# Patient Record
Sex: FEMALE | Race: BLACK OR AFRICAN AMERICAN | Employment: FULL TIME | URBAN - METROPOLITAN AREA
[De-identification: names, ages, dates, MRNs, and addresses within clinical notes are randomized per-mention and may not be internally consistent; named-entity substitution may affect disease eponyms.]

---

## 2018-05-04 PROBLEM — D72.819 CHRONIC LEUKOPENIA: Status: ACTIVE | Noted: 2018-05-04

## 2018-05-04 PROBLEM — D64.9 CHRONIC ANEMIA: Status: ACTIVE | Noted: 2018-05-04

## 2022-03-18 PROBLEM — D64.9 CHRONIC ANEMIA: Status: ACTIVE | Noted: 2018-05-04

## 2022-03-18 PROBLEM — D72.819 CHRONIC LEUKOPENIA: Status: ACTIVE | Noted: 2018-05-04

## 2023-01-10 DIAGNOSIS — D72.819 LEUKOPENIA, UNSPECIFIED TYPE: ICD-10-CM

## 2023-01-10 DIAGNOSIS — D47.2 MONOCLONAL GAMMOPATHY: ICD-10-CM

## 2023-01-10 DIAGNOSIS — D72.819 CHRONIC LEUKOPENIA: ICD-10-CM

## 2023-01-10 DIAGNOSIS — D64.9 ANEMIA, UNSPECIFIED TYPE: ICD-10-CM

## 2023-01-11 ENCOUNTER — LAB ONLY (OUTPATIENT)
Dept: ONCOLOGY | Age: 53
End: 2023-01-11

## 2023-01-11 DIAGNOSIS — D72.819 LEUKOPENIA, UNSPECIFIED TYPE: ICD-10-CM

## 2023-01-11 DIAGNOSIS — D72.819 CHRONIC LEUKOPENIA: ICD-10-CM

## 2023-01-11 DIAGNOSIS — D64.9 ANEMIA, UNSPECIFIED TYPE: ICD-10-CM

## 2023-01-11 DIAGNOSIS — D47.2 MONOCLONAL GAMMOPATHY: Primary | ICD-10-CM

## 2023-01-12 LAB
ABSOLUTE LYMPHOCYTE COUNT, 10803: 2.4 K/UL (ref 1–4.8)
BASOPHILS # BLD: 0 K/UL (ref 0–0.2)
BASOPHILS NFR BLD: 0 % (ref 0–2)
EOSINOPHIL # BLD: 0.1 K/UL (ref 0–0.5)
EOSINOPHIL NFR BLD: 1 % (ref 0–6)
ERYTHROCYTE [DISTWIDTH] IN BLOOD BY AUTOMATED COUNT: 14.5 % (ref 10–15.5)
GRANULOCYTES,GRANS: 49 % (ref 40–75)
HCT VFR BLD AUTO: 42.1 % (ref 35.1–48)
HGB BLD-MCNC: 12 G/DL (ref 11.7–16)
LYMPHOCYTES, LYMLT: 43 % (ref 20–45)
MCH RBC QN AUTO: 23 PG (ref 26–34)
MCHC RBC AUTO-ENTMCNC: 29 G/DL (ref 31–36)
MCV RBC AUTO: 81 FL (ref 80–99)
MONOCYTES # BLD: 0.4 K/UL (ref 0.1–1)
MONOCYTES NFR BLD: 7 % (ref 3–12)
NEUTROPHILS # BLD AUTO: 2.7 K/UL (ref 1.8–7.7)
PLATELET # BLD AUTO: 430 K/UL (ref 140–440)
PMV BLD AUTO: 10.3 FL (ref 9–13)
RBC # BLD AUTO: 5.23 M/UL (ref 3.8–5.2)
WBC # BLD AUTO: 5.5 K/UL (ref 4–11)

## 2023-01-16 DIAGNOSIS — D47.2 MONOCLONAL GAMMOPATHY: Primary | ICD-10-CM

## 2023-01-24 ENCOUNTER — OFFICE VISIT (OUTPATIENT)
Dept: ONCOLOGY | Age: 53
End: 2023-01-24
Payer: COMMERCIAL

## 2023-01-24 VITALS
HEIGHT: 68 IN | SYSTOLIC BLOOD PRESSURE: 124 MMHG | WEIGHT: 172.6 LBS | RESPIRATION RATE: 16 BRPM | DIASTOLIC BLOOD PRESSURE: 73 MMHG | BODY MASS INDEX: 26.16 KG/M2 | HEART RATE: 46 BPM

## 2023-01-24 DIAGNOSIS — D72.819 LEUKOPENIA, UNSPECIFIED TYPE: ICD-10-CM

## 2023-01-24 DIAGNOSIS — D47.2 MGUS (MONOCLONAL GAMMOPATHY OF UNKNOWN SIGNIFICANCE): ICD-10-CM

## 2023-01-24 DIAGNOSIS — D64.9 ANEMIA, UNSPECIFIED TYPE: ICD-10-CM

## 2023-01-24 DIAGNOSIS — D47.2 MONOCLONAL GAMMOPATHY: Primary | ICD-10-CM

## 2023-01-24 DIAGNOSIS — D72.819 CHRONIC LEUKOPENIA: ICD-10-CM

## 2023-01-24 PROCEDURE — 99213 OFFICE O/P EST LOW 20 MIN: CPT | Performed by: NURSE PRACTITIONER

## 2023-01-24 NOTE — PROGRESS NOTES
Hematology/Oncology  Progress Note    Name: Geovanna Owens  Date: 2023  : 1970    Sapna Hartley MD     Ms. Charlene Anaya is a 46y.o. year old female who was seen for MGUS       Subjective:   Ms. Emilie Cohen is a 60-year-old woman who has slowly progressive anemia in the setting of monoclonal gammopathy. She was diagnosed in 2014. She reports she has some left hip pain and she is followed by an orthopedic specialist and now has gout. The patient otherwise has no other complaints. Denied fever, chills, night sweat, unintentional weight loss, skin lumps or bumps, acute bleeding or bruising issues. Denied headache, acute vision change, dizziness, chest pain, worsen shortness of breath, palpitation, productive cough, nausea, vomiting, abdominal pain, altered bowel habits, dysuria, new bone pain or back pain, focal numbness or weakness      Past medical history, family history, and social history: these were reviewed and remains unchanged.     Past Medical History:   Diagnosis Date    Abdominal pain, unspecified site     Abnormal mammogram, unspecified     Acute nasopharyngitis (common cold)     Asthma     Cervicitis and endocervicitis     Diarrhea     Genital herpes, unspecified     Hypertension     no medication     Iron deficiency anemia, unspecified     Left breast mass 2013    surgery scheduled    Leukorrhea, not specified as infective     Neurofibromatosis, unspecified(237.70)     Other forms of retinal detachment(361.89)     Other nonspecific (abnormal) findings on radiological and other examinations of body structure     Pain in limb     Unspecified constipation     Unspecified disorder of menstruation and other abnormal bleeding from female genital tract     Vaginitis and vulvovaginitis, unspecified 2012     Past Surgical History:   Procedure Laterality Date    HX BREAST BIOPSY  2013    BREAST NEEDLE LOCALIZED BIOPSY performed by Marnette Dakin, MD at [address] left    HX HYSTERECTOMY  2015    partial    HX OTHER SURGICAL      mass removed from left under arm    HX TUBAL LIGATION       Social History     Socioeconomic History    Marital status: LEGALLY      Spouse name: Not on file    Number of children: Not on file    Years of education: Not on file    Highest education level: Not on file   Occupational History    Not on file   Tobacco Use    Smoking status: Former     Types: Cigarettes     Quit date: 1/3/2003     Years since quittin.0    Smokeless tobacco: Never   Substance and Sexual Activity    Alcohol use: Yes     Alcohol/week: 1.7 standard drinks     Types: 2 Cans of beer per week     Comment: socially    Drug use: No    Sexual activity: Not on file   Other Topics Concern    Not on file   Social History Narrative    Not on file     Social Determinants of Health     Financial Resource Strain: Not on file   Food Insecurity: Not on file   Transportation Needs: Not on file   Physical Activity: Not on file   Stress: Not on file   Social Connections: Not on file   Intimate Partner Violence: Not on file   Housing Stability: Not on file     Family History   Problem Relation Age of Onset    Breast Cancer Sister 43    Diabetes Mother     Stroke Mother     Cancer Father         prostate cancer    Cancer Sister         lung cancer    Heart Disease Neg Hx          Review of Systems   Constitutional: Negative. HENT: Negative. Eyes: Negative. Respiratory: Negative. Cardiovascular: Negative. Gastrointestinal: Negative. Genitourinary: Negative. Musculoskeletal: Negative. Skin: Negative. Neurological: Negative. Endo/Heme/Allergies: Negative. Psychiatric/Behavioral: Negative. Objective:   Visit Vitals  /73   Pulse (!) 46   Resp 16   Ht 5' 8\" (1.727 m)   Wt 78.3 kg (172 lb 9.6 oz)   LMP 2015   BMI 26.24 kg/m²     ECOG PS  Physical Exam  Cardiovascular:      Rate and Rhythm: Normal rate.    Musculoskeletal:         General: Normal range of motion. Cervical back: Normal range of motion. Skin:     General: Skin is warm. Neurological:      Mental Status: She is alert and oriented to person, place, and time. Psychiatric:         Mood and Affect: Mood normal.         Behavior: Behavior normal.         Thought Content: Thought content normal.        Lab data:      Results for orders placed or performed during the hospital encounter of 02/26/19   CBC WITH 3 PART DIFF     Status: Abnormal   Result Value Ref Range Status    WBC 5.6 4.5 - 13.0 K/uL Final    RBC 4.80 4. 10 - 5.10 M/uL Final    HGB 11.4 (L) 12.0 - 16.0 g/dL Final    HCT 37.8 36 - 48 % Final    MCV 78.8 78 - 102 FL Final    MCH 23.8 (L) 25.0 - 35.0 PG Final    MCHC 30.2 (L) 31 - 37 g/dL Final    RDW 13.4 11.5 - 14.5 % Final    PLATELET 823 394 - 322 K/uL Final    NEUTROPHILS 56 40 - 70 % Final    Mixed cells 6 0.1 - 17 % Final    LYMPHOCYTES 38 14 - 44 % Final    ABS. NEUTROPHILS 3.2 1.8 - 9.5 K/UL Final    ABS. MIXED CELLS 0.3 0.0 - 2.3 K/uL Final    ABS. LYMPHOCYTES 2.1 1.1 - 5.9 K/UL Final     Comment: Test performed at [address] [place] [address] or Outpatient [facility]fusion [facility] Location. Reviewed by Medical Director. DF AUTOMATED   Final           Assessment:     1. Monoclonal gammopathy    2. Leukopenia, unspecified type    3. Anemia, unspecified type    4. MGUS (monoclonal gammopathy of unknown significance)    5. Chronic leukopenia          Plan:   #Monoclonal gammopathy, MGUS  -- Patient was being followed by Dr. Glenis Loomis who retired recently. She has been on surveillance with plan to obtain bone marrow biopsy if her monoclonal paraprotein level exceeds or is greater than 2 g/dL   -- On 03/2021 SPEP showed an M-Tacos of 0.3g/dL. --on 07/27/2021 SPEP showed an M-Tacos of 0.3g/dl    -- 8/26/2020 Bone survey: No lytic lesions to confirm a diagnosis of monoclonal paraproteinemia.   --- 07/27/2021 Serum immunofixation continues to show an IgG kappa migrating in the late gamma region. The M-protein measures 0.3 g/dl   --- 11/03/2021 M-protein was 0.2 g/dl   --- CBC reported H/H 12.0 g/dl and 42.1%. Gammopathy evaluation pending   -- I have explained to her about recent workup. We will continue to monitor SPEP/SFLC/ILSA. If persistent or worsening M- Tacos would obtain marrow study for further evaluation of monoclonal gammopathy. # Anemia, unspecified type  # Chronic leukopenia  -- Her recent Iron profile and ferritin reviewed. -- 1/11/2023 H/H was stable at 12.0 g/dl and 42.1%  -- Will CTM CBC    No orders of the defined types were placed in this encounter. Annalisa Lynn, DANIEL  1/24/2023      Please note: This document has been produced using voice recognition software. Unrecognized errors in transcription may be present.

## 2023-05-24 ENCOUNTER — OFFICE VISIT (OUTPATIENT)
Age: 53
End: 2023-05-24
Payer: COMMERCIAL

## 2023-05-24 VITALS
HEART RATE: 50 BPM | BODY MASS INDEX: 24.86 KG/M2 | HEIGHT: 68 IN | DIASTOLIC BLOOD PRESSURE: 83 MMHG | RESPIRATION RATE: 18 BRPM | WEIGHT: 164 LBS | SYSTOLIC BLOOD PRESSURE: 122 MMHG

## 2023-05-24 DIAGNOSIS — D72.819 LEUKOPENIA, UNSPECIFIED TYPE: ICD-10-CM

## 2023-05-24 DIAGNOSIS — D64.9 ANEMIA, UNSPECIFIED TYPE: ICD-10-CM

## 2023-05-24 DIAGNOSIS — D47.2 MGUS (MONOCLONAL GAMMOPATHY OF UNKNOWN SIGNIFICANCE): Primary | ICD-10-CM

## 2023-05-24 DIAGNOSIS — D47.2 MONOCLONAL GAMMOPATHY: ICD-10-CM

## 2023-05-24 PROCEDURE — 99213 OFFICE O/P EST LOW 20 MIN: CPT | Performed by: INTERNAL MEDICINE

## 2023-05-24 RX ORDER — LACTULOSE 10 G/15ML
SOLUTION ORAL
COMMUNITY
Start: 2023-05-19

## 2023-05-24 ASSESSMENT — PATIENT HEALTH QUESTIONNAIRE - PHQ9
SUM OF ALL RESPONSES TO PHQ QUESTIONS 1-9: 0
2. FEELING DOWN, DEPRESSED OR HOPELESS: 0
SUM OF ALL RESPONSES TO PHQ QUESTIONS 1-9: 0
SUM OF ALL RESPONSES TO PHQ9 QUESTIONS 1 & 2: 0
SUM OF ALL RESPONSES TO PHQ QUESTIONS 1-9: 0
SUM OF ALL RESPONSES TO PHQ QUESTIONS 1-9: 0
1. LITTLE INTEREST OR PLEASURE IN DOING THINGS: 0

## 2023-05-24 ASSESSMENT — ENCOUNTER SYMPTOMS
DIARRHEA: 0
ABDOMINAL PAIN: 0
VOMITING: 0
COUGH: 0
BACK PAIN: 0
NAUSEA: 0
SHORTNESS OF BREATH: 0

## 2023-05-24 NOTE — PROGRESS NOTES
Hematology/Oncology Note      Date: 2023     Name: Aurelio Koch  : 1970     Megha Russell MD      Subjective:     Chief complaint: Monoclonal gammopathy/ MGUS     History of Present Illness:   Ms. Iva Fischer is a most pleasant 48y.o. year old female who was seen for monoclonal gammopathy/ MGUS. She was diagnosed in 2014. She reports she has some left hip pain and she is followed by an orthopedic specialist and also  has gout. The patient otherwise has no other complaints. Denied fever, chills, night sweat, unintentional weight loss, skin lumps or bumps, acute bleeding or bruising issues. Denied headache, acute vision change, dizziness, chest pain, worsen shortness of breath, palpitation, productive cough, nausea, vomiting, abdominal pain, altered bowel habits, dysuria, new bone pain or back pain, focal numbness or weakness.        Past Medical History, Family History, and Social History:    Past Medical History:   Diagnosis Date    Abdominal pain, unspecified site     Abnormal mammogram, unspecified     Acute nasopharyngitis (common cold)     Asthma     Cervicitis and endocervicitis     Diarrhea     Genital herpes, unspecified     Hypertension     no medication     Iron deficiency anemia, unspecified     Left breast mass 2013    surgery scheduled    Leukorrhea, not specified as infective     Neurofibromatosis, unspecified(237.70)     Other forms of retinal detachment(361.89)     Other nonspecific (abnormal) findings on radiological and other examinations of body structure     Pain in limb     Unspecified constipation     Unspecified disorder of menstruation and other abnormal bleeding from female genital tract     Vaginitis and vulvovaginitis, unspecified 2012     Past Surgical History:   Procedure Laterality Date    BREAST BIOPSY  2013    BREAST NEEDLE LOCALIZED BIOPSY performed by Jami Calixto MD at [address] left    HYSTERECTOMY ([facility])

## 2024-10-28 ENCOUNTER — HOSPITAL ENCOUNTER (EMERGENCY)
Facility: HOSPITAL | Age: 54
Discharge: HOME | End: 2024-10-28
Attending: EMERGENCY MEDICINE
Payer: COMMERCIAL

## 2024-10-28 ENCOUNTER — APPOINTMENT (OUTPATIENT)
Facility: HOSPITAL | Age: 54
End: 2024-10-28
Payer: COMMERCIAL

## 2024-10-28 VITALS
HEART RATE: 52 BPM | DIASTOLIC BLOOD PRESSURE: 85 MMHG | TEMPERATURE: 97.6 F | OXYGEN SATURATION: 99 % | RESPIRATION RATE: 16 BRPM | HEIGHT: 68 IN | BODY MASS INDEX: 25.76 KG/M2 | SYSTOLIC BLOOD PRESSURE: 132 MMHG | WEIGHT: 170 LBS

## 2024-10-28 DIAGNOSIS — R03.0 ELEVATED BLOOD PRESSURE READING: ICD-10-CM

## 2024-10-28 DIAGNOSIS — M54.50 ACUTE RIGHT-SIDED LOW BACK PAIN WITHOUT SCIATICA: Primary | ICD-10-CM

## 2024-10-28 LAB
ANION GAP SERPL CALC-SCNC: 5 MMOL/L (ref 3–18)
APPEARANCE UR: CLEAR
BASOPHILS # BLD: 0 K/UL (ref 0–0.1)
BASOPHILS NFR BLD: 0 % (ref 0–2)
BILIRUB UR QL: NEGATIVE
BUN SERPL-MCNC: 16 MG/DL (ref 7–18)
BUN/CREAT SERPL: 19 (ref 12–20)
CALCIUM SERPL-MCNC: 9.1 MG/DL (ref 8.5–10.1)
CHLORIDE SERPL-SCNC: 109 MMOL/L (ref 100–111)
CO2 SERPL-SCNC: 28 MMOL/L (ref 21–32)
COLOR UR: YELLOW
CREAT SERPL-MCNC: 0.84 MG/DL (ref 0.6–1.3)
DIFFERENTIAL METHOD BLD: ABNORMAL
EOSINOPHIL # BLD: 0.1 K/UL (ref 0–0.4)
EOSINOPHIL NFR BLD: 2 % (ref 0–5)
ERYTHROCYTE [DISTWIDTH] IN BLOOD BY AUTOMATED COUNT: 13.2 % (ref 11.6–14.5)
GLUCOSE SERPL-MCNC: 106 MG/DL (ref 74–99)
GLUCOSE UR STRIP.AUTO-MCNC: NEGATIVE MG/DL
HCT VFR BLD AUTO: 39.3 % (ref 35–45)
HGB BLD-MCNC: 12.3 G/DL (ref 12–16)
HGB UR QL STRIP: NEGATIVE
IMM GRANULOCYTES # BLD AUTO: 0 K/UL (ref 0–0.04)
IMM GRANULOCYTES NFR BLD AUTO: 0 % (ref 0–0.5)
KETONES UR QL STRIP.AUTO: NEGATIVE MG/DL
LEUKOCYTE ESTERASE UR QL STRIP.AUTO: NEGATIVE
LYMPHOCYTES # BLD: 2.7 K/UL (ref 0.9–3.6)
LYMPHOCYTES NFR BLD: 47 % (ref 21–52)
MCH RBC QN AUTO: 23.9 PG (ref 24–34)
MCHC RBC AUTO-ENTMCNC: 31.3 G/DL (ref 31–37)
MCV RBC AUTO: 76.5 FL (ref 78–100)
MONOCYTES # BLD: 0.4 K/UL (ref 0.05–1.2)
MONOCYTES NFR BLD: 6 % (ref 3–10)
NEUTS SEG # BLD: 2.6 K/UL (ref 1.8–8)
NEUTS SEG NFR BLD: 45 % (ref 40–73)
NITRITE UR QL STRIP.AUTO: NEGATIVE
NRBC # BLD: 0 K/UL (ref 0–0.01)
NRBC BLD-RTO: 0 PER 100 WBC
PH UR STRIP: 8 (ref 5–8)
PLATELET # BLD AUTO: 389 K/UL (ref 135–420)
PMV BLD AUTO: 9.8 FL (ref 9.2–11.8)
POTASSIUM SERPL-SCNC: 3.9 MMOL/L (ref 3.5–5.5)
PROT UR STRIP-MCNC: NEGATIVE MG/DL
RBC # BLD AUTO: 5.14 M/UL (ref 4.2–5.3)
SODIUM SERPL-SCNC: 142 MMOL/L (ref 136–145)
SP GR UR REFRACTOMETRY: 1.01 (ref 1–1.03)
UROBILINOGEN UR QL STRIP.AUTO: 1 EU/DL (ref 0.2–1)
WBC # BLD AUTO: 5.8 K/UL (ref 4.6–13.2)

## 2024-10-28 PROCEDURE — 96374 THER/PROPH/DIAG INJ IV PUSH: CPT

## 2024-10-28 PROCEDURE — 99284 EMERGENCY DEPT VISIT MOD MDM: CPT

## 2024-10-28 PROCEDURE — 85025 COMPLETE CBC W/AUTO DIFF WBC: CPT

## 2024-10-28 PROCEDURE — 80048 BASIC METABOLIC PNL TOTAL CA: CPT

## 2024-10-28 PROCEDURE — 74176 CT ABD & PELVIS W/O CONTRAST: CPT

## 2024-10-28 PROCEDURE — 6360000002 HC RX W HCPCS: Performed by: EMERGENCY MEDICINE

## 2024-10-28 PROCEDURE — 81003 URINALYSIS AUTO W/O SCOPE: CPT

## 2024-10-28 RX ORDER — KETOROLAC TROMETHAMINE 15 MG/ML
15 INJECTION, SOLUTION INTRAMUSCULAR; INTRAVENOUS ONCE
Status: COMPLETED | OUTPATIENT
Start: 2024-10-28 | End: 2024-10-28

## 2024-10-28 RX ORDER — IBUPROFEN 600 MG/1
600 TABLET, FILM COATED ORAL EVERY 6 HOURS PRN
Qty: 16 TABLET | Refills: 0 | Status: SHIPPED | OUTPATIENT
Start: 2024-10-28 | End: 2024-11-13

## 2024-10-28 RX ADMIN — KETOROLAC TROMETHAMINE 15 MG: 15 INJECTION, SOLUTION INTRAMUSCULAR; INTRAVENOUS at 19:38

## 2024-10-28 ASSESSMENT — PAIN SCALES - GENERAL
PAINLEVEL_OUTOF10: 4
PAINLEVEL_OUTOF10: 10
PAINLEVEL_OUTOF10: 10

## 2024-10-28 ASSESSMENT — PAIN DESCRIPTION - LOCATION
LOCATION: ABDOMEN;FLANK

## 2024-10-28 ASSESSMENT — PAIN - FUNCTIONAL ASSESSMENT: PAIN_FUNCTIONAL_ASSESSMENT: 0-10

## 2024-10-28 ASSESSMENT — PAIN DESCRIPTION - ORIENTATION
ORIENTATION: RIGHT

## 2024-10-28 NOTE — ED TRIAGE NOTES
A&O female with c/o right flank pain radiating to RLQ. Denies n/v/d. Denies urinary symptoms. Denies vaginal discharge/bleeding. 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 Pain radiating around right side and down into right groin.

## 2024-10-28 NOTE — ED PROVIDER NOTES
[facility] EMERGENCY DEPT  eMERGENCY dEPARTMENT eNCOUnter        Pt Name: Lashaun Rivas  MRN: [id number]  Birthdate 1970  Date of evaluation: 10/28/2024  Provider: Donnie Tesfaye MD  PCP: Edison Duran MD  Note Started: 7:16 PM EDT 10/28/2024          CHIEF COMPLAINT       Chief Complaint   Patient presents with    Abdominal Pain    Flank Pain       HISTORY OF PRESENT ILLNESS        Patient coming in with several days of right-sided flank discomfort.  Symptoms got much worse today and began to radiate to the right groin.  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  She denies lower extremity weakness, numbness or radicular symptoms.  No bowel or bladder symptoms other than her urine seems a bit darker than normal.  No history of kidney stones.  There is been no trauma.  She denies fevers or chills.  No nausea, vomiting or diarrhea.        REVIEW OF SYSTEMS         The following 10 systems are reviewed and negative except as noted in the HPI: Constitutional, Eyes, ENT, cardiovascular, pulmonary, GI, , neuro, skin, and musculoskeletal       PASTMEDICAL HISTORY     Past Medical History:   Diagnosis Date    Abdominal pain, unspecified site     Abnormal mammogram, unspecified     Acute nasopharyngitis (common cold) 2013    Asthma     Cervicitis and endocervicitis     Diarrhea     Genital herpes, unspecified 2012    Hypertension     no medication     Iron deficiency anemia, unspecified 2012    Left breast mass 6/2013    surgery scheduled    Leukorrhea, not specified as infective     Neurofibromatosis, unspecified(237.70)     Other forms of retinal detachment(361.89)     Other nonspecific (abnormal) findings on radiological and other examinations of body structure     Pain in limb     Unspecified constipation 2013    Unspecified disorder of menstruation and other abnormal bleeding from female genital tract     Vaginitis and vulvovaginitis, unspecified 03/2012

## 2024-10-29 NOTE — DISCHARGE INSTRUCTIONS
As we discussed, take the ibuprofen 3-4 times daily, every 6 hours, for the next 2 to 3 days, then as needed.  I strongly encourage you to use a topical medication such as capsaicin cream along with heat to the affected area and performing the dedicated stretching activities provided multiple times per day.  This is critical to your recovery.  Follow-up with primary care in the coming week for your back pain as well as your mildly elevated blood pressure.  Return to the emergency department for lower extremity weakness or numbness, bowel or bladder symptoms, saddle anesthesia, or if concerned

## 2024-10-29 NOTE — ED NOTES
Pt resting quietly on stretcher, awake and alert. Reports right flank and abdominal pain are much improved 4/10.  Pt is awaiting results from provider.